# Patient Record
Sex: MALE | Race: WHITE | NOT HISPANIC OR LATINO
[De-identification: names, ages, dates, MRNs, and addresses within clinical notes are randomized per-mention and may not be internally consistent; named-entity substitution may affect disease eponyms.]

---
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
## 2023-04-01 ENCOUNTER — WALK IN (OUTPATIENT)
Dept: URGENT CARE | Age: 23
End: 2023-04-01

## 2023-04-01 VITALS
OXYGEN SATURATION: 100 % | HEIGHT: 70 IN | DIASTOLIC BLOOD PRESSURE: 80 MMHG | RESPIRATION RATE: 18 BRPM | BODY MASS INDEX: 45.1 KG/M2 | HEART RATE: 98 BPM | TEMPERATURE: 97.8 F | SYSTOLIC BLOOD PRESSURE: 130 MMHG | WEIGHT: 315 LBS

## 2023-04-01 DIAGNOSIS — H65.03 NON-RECURRENT ACUTE SEROUS OTITIS MEDIA OF BOTH EARS: Primary | ICD-10-CM

## 2023-04-01 PROCEDURE — 99213 OFFICE O/P EST LOW 20 MIN: CPT | Performed by: NURSE PRACTITIONER

## 2023-04-01 RX ORDER — AMOXICILLIN 500 MG/1
1000 CAPSULE ORAL 3 TIMES DAILY
Qty: 30 CAPSULE | Refills: 0 | Status: SHIPPED | OUTPATIENT
Start: 2023-04-01 | End: 2023-04-06

## 2023-04-01 RX ORDER — IBUPROFEN 600 MG/1
600 TABLET ORAL EVERY 8 HOURS PRN
Qty: 15 TABLET | Refills: 0 | Status: SHIPPED | OUTPATIENT
Start: 2023-04-01 | End: 2023-04-06

## 2023-04-01 ASSESSMENT — ENCOUNTER SYMPTOMS
ADENOPATHY: 0
DIAPHORESIS: 0
HEADACHES: 0
CHILLS: 0
FEVER: 0
APPETITE CHANGE: 0

## 2024-07-10 ENCOUNTER — HOSPITAL ENCOUNTER (EMERGENCY)
Age: 24
Discharge: HOME | End: 2024-07-10

## 2024-07-10 VITALS
OXYGEN SATURATION: 97 % | HEART RATE: 94 BPM | RESPIRATION RATE: 20 BRPM | TEMPERATURE: 99 F | WEIGHT: 315 LBS | DIASTOLIC BLOOD PRESSURE: 83 MMHG | SYSTOLIC BLOOD PRESSURE: 158 MMHG

## 2024-07-10 DIAGNOSIS — J06.9 VIRAL UPPER RESPIRATORY ILLNESS: Primary | ICD-10-CM

## 2024-07-10 LAB — SARS-COV-2 RNA RESP QL NAA+PROBE: NOT DETECTED

## 2024-07-10 PROCEDURE — 87430 STREP A AG IA: CPT | Performed by: PHYSICIAN ASSISTANT

## 2024-07-10 PROCEDURE — 99283 EMERGENCY DEPT VISIT LOW MDM: CPT

## 2024-07-10 NOTE — ED PROVIDER NOTES
Patient Seen in: [facility] In [place]      History     Chief Complaint   Patient presents with    Headache    Fever     Stated Complaint: headache and fever 101    Subjective:   HPI    24-year-old male.  Medical history of morbid obesity.  Daily vaping.  Onset this morning of headache, sore throat, malaise, low-grade congestion and cough.  No vomiting or diarrhea.  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    Objective:   History reviewed. No pertinent past medical history.           Past Surgical History:   Procedure Laterality Date    Tonsillectomy                  Social History     Socioeconomic History    Marital status: Single   Tobacco Use    Smoking status: Never   Vaping Use    Vaping status: Every Day   Substance and Sexual Activity    Alcohol use: No    Drug use: Never              Review of Systems    Positive for stated Chief Complaint: Headache and Fever    Other systems are as noted in HPI.  Constitutional and vital signs reviewed.      All other systems reviewed and negative except as noted above.    Physical Exam     ED Triage Vitals [07/10/24 1451]   /83   Pulse 94   Resp 20   Temp 98.6 °F (37 °C)   Temp src    SpO2 97 %   O2 Device None (Room air)       Current Vitals:   Vital Signs  BP: 158/83  Pulse: 94  Resp: 20  Temp: 98.6 °F (37 °C)    Oxygen Therapy  SpO2: 97 %  O2 Device: None (Room air)            Physical Exam    Gen: Well appearing, well groomed, alert and aware x 3  Neck: Supple, full range of motion, no thyromegaly or lymphadenopathy.  Eye examination: EOMs are intact, normal conjunctival  ENT: No injection noted to the bilateral auditory canals; no loss of landmarks. Normal nasal mucosa without audible nasal congestion.  Oropharynx is patent without evidence of erythema, exudates or deviation.  No stridor to auscultation  Lung: No distress, RR, no retraction, breath sounds are clear bilaterally  Cardio: Regular rate and rhythm, normal S1-S2, no murmur  appreciable  Skin: No sign of trauma, Skin warm and dry, no induration or sign of infection.  No rash noted      ED Course     Labs Reviewed   RAPID SARS-COV-2 BY PCR - Normal   RAPID STREP A SCREEN (LC) - Normal    Narrative:     A confirmatory culture is recommended if clinically indicated.                      MDM        COVID-negative      Nontoxic.  Normal vital signs.  Onset of symptoms this morning.  COVID swab obtained.  Rapid strep.      Strep also negative    Supportive measures.  Push clear fluids.  Vitamin C and zinc.  Get plenty of rest                     Medical Decision Making      Disposition and Plan     Clinical Impression:  1. Viral upper respiratory illness         Disposition:  Discharge  7/10/2024  3:49 pm    Follow-up:  David Nassar MD  [address] [facility]  [address]  [facility] [address] [phone]    Follow up            Medications Prescribed:  There are no discharge medications for this patient.

## (undated) NOTE — LETTER
Date & Time: 7/10/2024, 3:49 PM  Patient: Kofi Davidson  Encounter Provider(s):    Irving Roper PA-C       To Whom It May Concern:    Kofi Davidson was seen and treated in our department on 7/10/2024. He should not return to work until 7/13/24 .    If you have any questions or concerns, please do not hesitate to call.        _____________________________  Physician/APC Signature

## (undated) NOTE — LETTER
Date & Time: 7/15/2024, 5:49 PM  Patient: Kofi Davidson  Encounter Provider(s):    Irving Roper PA-C       To Whom It May Concern:    Kofi Davidson was seen and treated in our department on 7/10/2024. He should not return to work until 07/13/2024 without restrictions .    If you have any questions or concerns, please do not hesitate to call.        _____________________________  Physician/APC Signature